# Patient Record
Sex: FEMALE | Race: WHITE | URBAN - METROPOLITAN AREA
[De-identification: names, ages, dates, MRNs, and addresses within clinical notes are randomized per-mention and may not be internally consistent; named-entity substitution may affect disease eponyms.]

---

## 2021-02-03 ENCOUNTER — OFFICE VISIT (OUTPATIENT)
Dept: FAMILY MEDICINE CLINIC | Age: 26
End: 2021-02-03
Payer: COMMERCIAL

## 2021-02-03 ENCOUNTER — TELEPHONE (OUTPATIENT)
Dept: OBGYN CLINIC | Age: 26
End: 2021-02-03

## 2021-02-03 VITALS
TEMPERATURE: 98.2 F | HEART RATE: 98 BPM | SYSTOLIC BLOOD PRESSURE: 120 MMHG | DIASTOLIC BLOOD PRESSURE: 78 MMHG | HEIGHT: 70 IN | OXYGEN SATURATION: 99 % | RESPIRATION RATE: 20 BRPM | WEIGHT: 293 LBS | BODY MASS INDEX: 41.95 KG/M2

## 2021-02-03 DIAGNOSIS — Z23 NEED FOR HPV VACCINE: ICD-10-CM

## 2021-02-03 DIAGNOSIS — Z13.228 SCREENING FOR METABOLIC DISORDER: ICD-10-CM

## 2021-02-03 DIAGNOSIS — Z11.4 ENCOUNTER FOR SCREENING FOR HIV: ICD-10-CM

## 2021-02-03 DIAGNOSIS — Z12.4 CERVICAL CANCER SCREENING: ICD-10-CM

## 2021-02-03 DIAGNOSIS — R51.9 FREQUENT HEADACHES: ICD-10-CM

## 2021-02-03 DIAGNOSIS — Z76.89 ENCOUNTER TO ESTABLISH CARE: Primary | ICD-10-CM

## 2021-02-03 DIAGNOSIS — Z13.220 SCREENING FOR HYPERLIPIDEMIA: ICD-10-CM

## 2021-02-03 DIAGNOSIS — Z11.59 NEED FOR HEPATITIS C SCREENING TEST: ICD-10-CM

## 2021-02-03 DIAGNOSIS — E66.01 CLASS 3 SEVERE OBESITY WITHOUT SERIOUS COMORBIDITY WITH BODY MASS INDEX (BMI) OF 40.0 TO 44.9 IN ADULT, UNSPECIFIED OBESITY TYPE (HCC): ICD-10-CM

## 2021-02-03 PROCEDURE — G8484 FLU IMMUNIZE NO ADMIN: HCPCS | Performed by: NURSE PRACTITIONER

## 2021-02-03 PROCEDURE — 99395 PREV VISIT EST AGE 18-39: CPT | Performed by: NURSE PRACTITIONER

## 2021-02-03 SDOH — ECONOMIC STABILITY: TRANSPORTATION INSECURITY
IN THE PAST 12 MONTHS, HAS LACK OF TRANSPORTATION KEPT YOU FROM MEETINGS, WORK, OR FROM GETTING THINGS NEEDED FOR DAILY LIVING?: NO

## 2021-02-03 SDOH — HEALTH STABILITY: PHYSICAL HEALTH: ON AVERAGE, HOW MANY MINUTES DO YOU ENGAGE IN EXERCISE AT THIS LEVEL?: 30 MIN

## 2021-02-03 SDOH — ECONOMIC STABILITY: FOOD INSECURITY: WITHIN THE PAST 12 MONTHS, YOU WORRIED THAT YOUR FOOD WOULD RUN OUT BEFORE YOU GOT MONEY TO BUY MORE.: NEVER TRUE

## 2021-02-03 SDOH — HEALTH STABILITY: MENTAL HEALTH: HOW MANY STANDARD DRINKS CONTAINING ALCOHOL DO YOU HAVE ON A TYPICAL DAY?: 1 OR 2

## 2021-02-03 SDOH — HEALTH STABILITY: MENTAL HEALTH
STRESS IS WHEN SOMEONE FEELS TENSE, NERVOUS, ANXIOUS, OR CAN'T SLEEP AT NIGHT BECAUSE THEIR MIND IS TROUBLED. HOW STRESSED ARE YOU?: TO SOME EXTENT

## 2021-02-03 SDOH — HEALTH STABILITY: MENTAL HEALTH: HOW OFTEN DO YOU HAVE A DRINK CONTAINING ALCOHOL?: MONTHLY OR LESS

## 2021-02-03 SDOH — HEALTH STABILITY: PHYSICAL HEALTH: ON AVERAGE, HOW MANY DAYS PER WEEK DO YOU ENGAGE IN MODERATE TO STRENUOUS EXERCISE (LIKE A BRISK WALK)?: 5 DAYS

## 2021-02-03 ASSESSMENT — PATIENT HEALTH QUESTIONNAIRE - PHQ9
SUM OF ALL RESPONSES TO PHQ QUESTIONS 1-9: 0
SUM OF ALL RESPONSES TO PHQ QUESTIONS 1-9: 0
1. LITTLE INTEREST OR PLEASURE IN DOING THINGS: 0
2. FEELING DOWN, DEPRESSED OR HOPELESS: 0
SUM OF ALL RESPONSES TO PHQ QUESTIONS 1-9: 0
SUM OF ALL RESPONSES TO PHQ9 QUESTIONS 1 & 2: 0

## 2021-02-03 ASSESSMENT — ENCOUNTER SYMPTOMS
VOMITING: 0
BLURRED VISION: 1
COUGH: 0
DIARRHEA: 0
CONSTIPATION: 0
PHOTOPHOBIA: 1
NAUSEA: 1
SHORTNESS OF BREATH: 0
RESPIRATORY NEGATIVE: 1
ABDOMINAL PAIN: 0

## 2021-02-03 NOTE — PROGRESS NOTES
[facility]  [facility]  [address]  [address] [place] [address]  Dept: [phone]    2/3/2021    CHIEF COMPLAINT    Chief Complaint   Patient presents with    New Patient    Health Maintenance     addressed with the patient       HPI    Brad Munroe is a 23 y.o. female who presents   Chief Complaint   Patient presents with   [address] Patient   [facility] Maintenance     addressed with the patient     Appointment to establish care. Graduated form [employer] for pre-med major. 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 Applying to med school. Apply to [address] [place] [address]. Currently doing [employer] 5 days per week. She is starting weight watchers this past week. Headaches- noting headaches a couple per week and then will not have a HA for a couple weeks. Taking ibuprofen or resting for mild headaches. Taking Excedrin for more severe headaches. Noting increase in frequency roughly 3 years ago. She does not notice an association with stress or menses. Recent eye exam 6/2020. Wearing contacts     Due for pap. She has never had one. She is sexually active. Using condoms. Declines need for TB testing at this time.     Headache This is a recurrent problem. The current episode started more than 1 year ago. The problem occurs intermittently. The problem has been waxing and waning. The pain is located in the retro-orbital, right unilateral and temporal (Noting unilateral headaches 50 % of the time. Noting temporal in relation to tooth clenching) region. The pain does not radiate. The quality of the pain is described as squeezing and throbbing. Associated symptoms include blurred vision (\"sometimes\"), nausea (\"occastionally, but not often\" ) and photophobia. Pertinent negatives include no abdominal pain, coughing, dizziness, fever, loss of balance, phonophobia or vomiting. Exacerbated by: dehydration  She has tried NSAIDs, Excedrin, acetaminophen and darkened room for the symptoms. The treatment provided mild relief. There is no history of migraine headaches, migraines in the family or recent head traumas. Vitals:    02/03/21 1032   BP: 120/78   Site: Left Upper Arm   Position: Sitting   Cuff Size: Large Adult   Pulse: 98   Resp: 20   Temp: 98.2 °F (36.8 °C)   TempSrc: Temporal   SpO2: 99%   Weight: 295 lb 3.2 oz (133.9 kg)   Height: 5' 10.3\" (1.786 m)       Wt Readings from Last 3 Encounters:   02/03/21 295 lb 3.2 oz (133.9 kg)     BP Readings from Last 3 Encounters:   02/03/21 120/78       REVIEW OF SYSTEMS    Review of Systems   Constitutional: Negative. Negative for chills, fatigue and fever. Eyes: Positive for blurred vision (\"sometimes\") and photophobia. Negative for visual disturbance. Respiratory: Negative. Negative for cough and shortness of breath. Cardiovascular: Negative. Negative for chest pain and palpitations. Gastrointestinal: Positive for nausea (\"occastionally, but not often\" ). Negative for abdominal pain, constipation, diarrhea and vomiting. Genitourinary: Negative. Neurological: Positive for headaches. Negative for dizziness and loss of balance. Attends Nondenominational service: None     Active member of club or organization: None     Attends meetings of clubs or organizations: None     Relationship status: None    Intimate partner violence     Fear of current or ex partner: None     Emotionally abused: None     Physically abused: None     Forced sexual activity: None   Other Topics Concern    None   Social History Narrative    None       SURGICAL HISTORY    Past Surgical History:   Procedure Laterality Date    WISDOM TOOTH EXTRACTION         CURRENT MEDICATIONS    No current outpatient medications on file. No current facility-administered medications for this visit. ALLERGIES    No Known Allergies    PHYSICAL EXAM   Physical Exam  Vitals signs and nursing note reviewed. Constitutional:       General: She is not in acute distress. Appearance: She is well-developed. She is obese. She is not diaphoretic. Interventions: Face mask in place. HENT:      Head: Normocephalic. Right Ear: Hearing normal.      Left Ear: Hearing normal.   Eyes:      General:         Right eye: No discharge. Left eye: No discharge. Pupils: Pupils are equal.   Neck:      Musculoskeletal: Normal range of motion. Cardiovascular:      Rate and Rhythm: Normal rate and regular rhythm. Pulses: Normal pulses. Radial pulses are 2+ on the right side and 2+ on the left side. Heart sounds: Normal heart sounds, S1 normal and S2 normal. No murmur. Pulmonary:      Effort: Pulmonary effort is normal. No respiratory distress. Breath sounds: Normal breath sounds. No wheezing. Skin:     General: Skin is warm and dry. Neurological:      Mental Status: She is alert and oriented to person, place, and time. Psychiatric:         Behavior: Behavior normal. Behavior is cooperative. ASSESSMENT/PLAN  1. Encounter to establish care  2.  Frequent headaches  Assessment & Plan: Encouraged patient to stay well-hydrated, attempt to get restorative sleep, check magnesium level and continue over-the-counter pain relievers as needed. Patient to notify office if symptoms worsen or fail to improve  Orders:  -     Comprehensive Metabolic Panel; Future  -     Magnesium; Future  3. Class 3 severe obesity without serious comorbidity with body mass index (BMI) of 40.0 to 44.9 in adult, unspecified obesity type [facility])  Assessment & Plan:  Encouraged to continue following regular exercise regiment and weight watchers. 4. Cervical cancer screening  -     Yassine Damon, [address]N, [place]  5. Screening for hyperlipidemia  -     Lipid Panel; Future  6. Screening for metabolic disorder  -     Comprehensive Metabolic Panel; Future  -     Magnesium; Future  -     TSH with Reflex; Future  7. Encounter for screening for HIV  -     HIV Screen; Future  8. Need for hepatitis C screening test  -     Hepatitis C Antibody; Future  9. Need for HPV vaccine    Educated regarding recommendation for HPV vaccine. Jonna Ruiz received counseling on the following healthy behaviors: nutrition, exercise and medication adherence  Reviewed prior labs and health maintenance  Continue current medications, diet and exercise. Discussed use, benefit, and side effects of prescribed medications. Barriers to medication compliance addressed. Patient given educational materials - see patient instructions  Was a self-tracking handout given in paper form or via [employer]? Yes    Requested Prescriptions      No prescriptions requested or ordered in this encounter       All patient questions answered. Patient voiced understanding. Quality Measures    Body mass index is 42 kg/m². Elevated. Weight control planned discussed Healthy diet and regular exercise. BP: 120/78 Blood pressure is normal. Treatment plan consists of No treatment change needed.

## 2021-02-03 NOTE — PROGRESS NOTES
depression  Chief Complaint   Patient presents with    New Patient   [facility]     addressed with the patient

## 2021-02-24 ENCOUNTER — HOSPITAL ENCOUNTER (OUTPATIENT)
Age: 26
Setting detail: SPECIMEN
Discharge: HOME | End: 2021-02-24
Payer: COMMERCIAL

## 2021-02-24 DIAGNOSIS — Z11.4 ENCOUNTER FOR SCREENING FOR HIV: ICD-10-CM

## 2021-02-24 DIAGNOSIS — Z13.228 SCREENING FOR METABOLIC DISORDER: ICD-10-CM

## 2021-02-24 DIAGNOSIS — Z13.220 SCREENING FOR HYPERLIPIDEMIA: ICD-10-CM

## 2021-02-24 DIAGNOSIS — R51.9 FREQUENT HEADACHES: ICD-10-CM

## 2021-02-24 DIAGNOSIS — Z11.59 NEED FOR HEPATITIS C SCREENING TEST: ICD-10-CM

## 2021-02-24 LAB
ALBUMIN SERPL-MCNC: 4.4 G/DL (ref 3.5–5.2)
ALBUMIN/GLOBULIN RATIO: 1.3 (ref 1–2.5)
ALP BLD-CCNC: 86 U/L (ref 35–104)
ALT SERPL-CCNC: 32 U/L (ref 5–33)
ANION GAP SERPL CALCULATED.3IONS-SCNC: 15 MMOL/L (ref 9–17)
AST SERPL-CCNC: 24 U/L
BILIRUB SERPL-MCNC: 0.29 MG/DL (ref 0.3–1.2)
BUN BLDV-MCNC: 13 MG/DL (ref 6–20)
BUN/CREAT BLD: ABNORMAL (ref 9–20)
CALCIUM SERPL-MCNC: 9.5 MG/DL (ref 8.6–10.4)
CHLORIDE BLD-SCNC: 102 MMOL/L (ref 98–107)
CHOLESTEROL/HDL RATIO: 4
CHOLESTEROL: 198 MG/DL
CO2: 21 MMOL/L (ref 20–31)
CREAT SERPL-MCNC: 0.71 MG/DL (ref 0.5–0.9)
GFR AFRICAN AMERICAN: >60 ML/MIN
GFR NON-AFRICAN AMERICAN: >60 ML/MIN
GFR SERPL CREATININE-BSD FRML MDRD: ABNORMAL ML/MIN/{1.73_M2}
GFR SERPL CREATININE-BSD FRML MDRD: ABNORMAL ML/MIN/{1.73_M2}
GLUCOSE BLD-MCNC: 88 MG/DL (ref 70–99)
HDLC SERPL-MCNC: 50 MG/DL
HEPATITIS C ANTIBODY: NONREACTIVE
HIV AG/AB: NONREACTIVE
LDL CHOLESTEROL: 123 MG/DL (ref 0–130)
MAGNESIUM: 2 MG/DL (ref 1.6–2.6)
POTASSIUM SERPL-SCNC: 4.1 MMOL/L (ref 3.7–5.3)
SODIUM BLD-SCNC: 138 MMOL/L (ref 135–144)
TOTAL PROTEIN: 7.8 G/DL (ref 6.4–8.3)
TRIGL SERPL-MCNC: 125 MG/DL
TSH SERPL DL<=0.05 MIU/L-ACNC: 2.02 MIU/L (ref 0.3–5)
VLDLC SERPL CALC-MCNC: NORMAL MG/DL (ref 1–30)

## 2021-03-05 ENCOUNTER — OFFICE VISIT (OUTPATIENT)
Dept: OBGYN CLINIC | Age: 26
End: 2021-03-05
Payer: COMMERCIAL

## 2021-03-05 ENCOUNTER — HOSPITAL ENCOUNTER (OUTPATIENT)
Age: 26
Setting detail: SPECIMEN
Discharge: HOME | End: 2021-03-05
Payer: COMMERCIAL

## 2021-03-05 VITALS
SYSTOLIC BLOOD PRESSURE: 125 MMHG | HEIGHT: 70 IN | TEMPERATURE: 97.3 F | WEIGHT: 293 LBS | HEART RATE: 106 BPM | BODY MASS INDEX: 41.95 KG/M2 | DIASTOLIC BLOOD PRESSURE: 80 MMHG

## 2021-03-05 DIAGNOSIS — N92.6 IRREGULAR MENSES: ICD-10-CM

## 2021-03-05 DIAGNOSIS — Z01.419 WELL WOMAN EXAM: Primary | ICD-10-CM

## 2021-03-05 DIAGNOSIS — Z23 NEED FOR HPV VACCINATION: ICD-10-CM

## 2021-03-05 DIAGNOSIS — Z01.419 WELL WOMAN EXAM: ICD-10-CM

## 2021-03-05 PROCEDURE — 99385 PREV VISIT NEW AGE 18-39: CPT | Performed by: ADVANCED PRACTICE MIDWIFE

## 2021-03-05 PROCEDURE — G8484 FLU IMMUNIZE NO ADMIN: HCPCS | Performed by: ADVANCED PRACTICE MIDWIFE

## 2021-03-05 RX ORDER — NORETHINDRONE ACETATE AND ETHINYL ESTRADIOL 1MG-20(21)
1 KIT ORAL DAILY
Qty: 1 PACKET | Refills: 12 | Status: SHIPPED | OUTPATIENT
Start: 2021-03-05

## 2021-03-05 SDOH — SOCIAL STABILITY: SOCIAL NETWORK: IN A TYPICAL WEEK, HOW MANY TIMES DO YOU TALK ON THE PHONE WITH FAMILY, FRIENDS, OR NEIGHBORS?: NOT ASKED

## 2021-03-05 SDOH — SOCIAL STABILITY: SOCIAL NETWORK
DO YOU BELONG TO ANY CLUBS OR ORGANIZATIONS SUCH AS CHURCH GROUPS UNIONS, FRATERNAL OR ATHLETIC GROUPS, OR SCHOOL GROUPS?: NOT ASKED

## 2021-03-05 SDOH — SOCIAL STABILITY: SOCIAL INSECURITY: WITHIN THE LAST YEAR, HAVE YOU BEEN HUMILIATED OR EMOTIONALLY ABUSED IN OTHER WAYS BY YOUR PARTNER OR EX-PARTNER?: NO

## 2021-03-05 SDOH — SOCIAL STABILITY: SOCIAL NETWORK: ARE YOU MARRIED, WIDOWED, DIVORCED, SEPARATED, NEVER MARRIED, OR LIVING WITH A PARTNER?: NOT ASKED

## 2021-03-05 SDOH — SOCIAL STABILITY: SOCIAL INSECURITY: WITHIN THE LAST YEAR, HAVE YOU BEEN AFRAID OF YOUR PARTNER OR EX-PARTNER?: NO

## 2021-03-05 SDOH — SOCIAL STABILITY: SOCIAL INSECURITY
WITHIN THE LAST YEAR, HAVE YOU BEEN KICKED, HIT, SLAPPED, OR OTHERWISE PHYSICALLY HURT BY YOUR PARTNER OR EX-PARTNER?: NO

## 2021-03-05 SDOH — SOCIAL STABILITY: SOCIAL NETWORK: HOW OFTEN DO YOU ATTENT MEETINGS OF THE CLUB OR ORGANIZATION YOU BELONG TO?: NOT ASKED

## 2021-03-05 SDOH — SOCIAL STABILITY: SOCIAL NETWORK: HOW OFTEN DO YOU ATTEND CHURCH OR RELIGIOUS SERVICES?: NOT ASKED

## 2021-03-05 SDOH — SOCIAL STABILITY: SOCIAL NETWORK: HOW OFTEN DO YOU GET TOGETHER WITH FRIENDS OR RELATIVES?: NOT ASKED

## 2021-03-05 ASSESSMENT — ANXIETY QUESTIONNAIRES
1. FEELING NERVOUS, ANXIOUS, OR ON EDGE: 0-NOT AT ALL
2. NOT BEING ABLE TO STOP OR CONTROL WORRYING: 0-NOT AT ALL
5. BEING SO RESTLESS THAT IT IS HARD TO SIT STILL: 0-NOT AT ALL
GAD7 TOTAL SCORE: 0
3. WORRYING TOO MUCH ABOUT DIFFERENT THINGS: 0-NOT AT ALL
7. FEELING AFRAID AS IF SOMETHING AWFUL MIGHT HAPPEN: 0-NOT AT ALL

## 2021-03-05 ASSESSMENT — PATIENT HEALTH QUESTIONNAIRE - PHQ9
SUM OF ALL RESPONSES TO PHQ QUESTIONS 1-9: 0
2. FEELING DOWN, DEPRESSED OR HOPELESS: 0
SUM OF ALL RESPONSES TO PHQ QUESTIONS 1-9: 0

## 2021-03-05 ASSESSMENT — ENCOUNTER SYMPTOMS
DIARRHEA: 0
SHORTNESS OF BREATH: 0
NAUSEA: 0
ABDOMINAL PAIN: 0
VOMITING: 0

## 2021-03-05 NOTE — PROGRESS NOTES
Patient would like to discuss contraception currently not on anything. After obtaining consent, and per orders of [facility] CNM, injection of Gardasil given in Right deltoid by Leonela Wilhelm. Patient instructed to remain in clinic for 20 minutes afterwards, and to report any adverse reaction to me immediately.     [id number]  QLU:[id number]  EXP:9/6/22    Vaccine information statement HPV (Human Papilloma Virus) edition 10/30/2019 given to patient on 3/5/2021

## 2021-03-05 NOTE — PROGRESS NOTES
[place] [address] [facility] [address]  Dept: [phone]    Patient Name: Majo Van  Patient Age: 23 y.o. Date of Visit: 3/5/2021    Subjective  Chief Complaint   Patient presents with    Gynecologic Exam     NP to establish, Never had pap / Nicolas Pleva denied     Patient's last menstrual period was 02/20/2021 (exact date). Chaperone for Intimate Exam   Chaperone was offered as part of the rooming process. Patient declined and agrees to continue with exam without a chaperone.  Chaperone: n/a    HPI  Patient arrives for annual exam.  Patient admits to concerns today. Concerns include irregular menses. Reports she has always had irregular menses since starting her period was she was [age]. Reports sometimes they are 30 day cycles, sometimes in the 40's or she can skip a month at a time. Patient reports is  sexually active with 1 male partner(s). Patient denies  pain with sex. Patient denies a history of sexually transmitted infection(s). Patient does want screening for sexually transmitted infection(s). Reports is not on contraception. 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    She reports there is a personal history of:    Smoking (> 15 cigs/day): No    Migraine with Aura:  No    HTN (> 160/100): No    DVT:  No    Thrombophilias:  No    Stroke (CVA): No     Ischemic heart disease:  No    Valvular heart disease (A Fib, Pul HTN, etc):  No    Positive Antiphospholipid Abs:  No    Liver Disease:  No      PHQ Scores 3/5/2021 2/3/2021   PHQ2 Score 0 0   PHQ9 Score 0 0     Interpretation of Total Score Depression Severity: 1-4 = Minimal depression, 5-9 = Mild depression, 10-14 = Moderate depression, 15-19 = Moderately severe depression, 20-27 = Severe depression  CHARLES 7 SCORE 3/5/2021   CHARLES-7 Total Score 0     Interpretation of CHARLES-7 score: 5-9 = mild anxiety, 10-14 = moderate anxiety, 15+ = severe anxiety. Recommend referral to behavioral health for scores 10 or greater. Social Determinants of Health:      Social Needs    Financial resource strain: Not hard at all      Social Needs   Food insecurity    Worry: Never true    Inability: Never true      Social Needs   Transportation needs    Medical: No    Non-medical: No        Lifestyle    Stress: To some extent      Relationships   Intimate partner violence    Fear of current or ex partner: No    Emotionally abused: No    Physically abused: No    Forced sexual activity: No       Review of Systems   Constitutional: Negative for unexpected weight change. Respiratory: Negative for shortness of breath. Cardiovascular: Negative for chest pain, palpitations and leg swelling. Gastrointestinal: Negative for abdominal pain, diarrhea, nausea and vomiting. Genitourinary: Positive for menstrual problem. Negative for difficulty urinating, dyspareunia, vaginal discharge and vaginal pain. Neurological: Negative for dizziness, light-headedness and headaches. Preventive Health Screening:   Date of last pap:n/a    Preventive screening: Yes    Family history of Breast, Ovarian, Colon or Uterine Cancer:  no     If Yes see scanned worksheet    Objective  /80   Pulse 106   Temp 97.3 °F (36.3 °C)   Ht 5' 10\" (1.778 m)   Wt (!) 303 lb 9.6 oz (137.7 kg)   LMP 02/20/2021 (Exact Date)   BMI 43.56 kg/m²       Gynecologic History  Menarche: [age]  Monthly menses (days): irregular   Length: 5  Flow: moderate    Gardasil Series: Yes    Sexually active: Yes  New Sex Partner within 3 months: No  Domestic Violence Screening: negative    STD history: No     Birth control method :No       Physical Exam  Constitutional:       Appearance: Normal appearance. Genitourinary:      No vulval tenderness, ulcerations or Bartholin's cyst noted. No signs of labial injury.       No signs of injury in the vagina. No vaginal tenderness or ulceration. No cervical motion tenderness or friability. Genitourinary Comments: Pap obtained without difficulty    Neck:      Musculoskeletal: Normal range of motion. Cardiovascular:      Rate and Rhythm: Normal rate and regular rhythm. Pulmonary:      Effort: Pulmonary effort is normal.      Breath sounds: Normal breath sounds. Neurological:      Mental Status: She is alert and oriented to person, place, and time. Psychiatric:         Mood and Affect: Mood normal.         Behavior: Behavior normal.         Thought Content: Thought content normal.         Judgment: Judgment normal.   Vitals signs reviewed. Assessment & Plan  1. Well woman exam  - PAP SMEAR; Future  - C.trachomatis N.gonorrhoeae DNA, Thin Prep; Future    Age 25 and > Well Woman Care  General Health:  [x] Alcohol screening & counseling  [x] Blood pressure screening: normal  [x] Contraceptive counseling & methods: starting OCP  [x] Depression Screening: Negative  [] Diabetes Screening: did not discuss at visit     [] Folic acid supplementation  [x] Healthful diet & activity counseling:  discussed  [x] Interpersonal violence screening: Negative  [x] Lipid screening: done on 02/2021  [x] Obesity Screening: Body mass index is 43.56 kg/m². obese  [] Osteoporosis screening  [x] Substance use screening & counseling  [x] Tobacco screening & counseling  [x] Urinary incontinence screening    Infectious Diseases:  [x] Gonorrhea & chlamydia screening: discussed  [x] Hepatitis C Screening results reviewed  [x] HIV risk assessment/ testing (at least once in lifetime): results reviewed   [x] Immunizations:   [x] STI prevention counseling    Cancer:  [x] Cervical cancer screening: discussed. Pap per ASCCP guidelines   [x] Mammograms (started at 39yrs old): discussed  [x] BRCA testing risk assessment: discussed      2.  Need for HPV vaccination  - only received 2/3 series, last injection given at visit today.   - Vaccine information statement HPV (Human Papilloma Virus) edition 10/30/2019 given to patient on 3/5/2021  - hpv vaccine (GARDASIL) injection 0.5 mL    3. Irregular menses  - Discussed options and patient desires to trial OCP. Reports she has not had sex since her last menstrual period. Reviewed she can start the pill now or wait until the first day of her next menses. Recommended using condoms with every sexual encounter. Reviewed if she starts pills now will need to use a back up method for 7 days to be protected against a pregnancy  - Patient was educated to notify office and seek medical care if abdominal pain, chest pain, severe headaches, eye problems/loss of vision, or severe leg pain or swelling in the calf occurs (ACHES). - norethindrone-ethinyl estradiol (LOESTRIN FE 1/20) 1-20 MG-MCG per tablet; Take 1 tablet by mouth daily  Dispense: 1 packet; Refill: 12      Return in about 1 year (around 3/5/2022) for Annual.     The patient, Shellie Banks , was seen with a total time spent of 25 minutes for the visit on this date of service by the [facility]  The time component, involved both face-to-face (counseling and education)  and non face-to-face time (care coordination), spent in determining the total time component. She was also counseled on her preventative health maintenance recommendations and follow-up.     Electronically Signed by Dania Rojas

## 2021-03-09 LAB
CHLAMYDIA BY THIN PREP: NEGATIVE
N. GONORRHOEAE DNA, THIN PREP: NEGATIVE
SPECIMEN DESCRIPTION: NORMAL

## 2021-03-13 LAB — CYTOLOGY REPORT: NORMAL

## 2021-07-22 ENCOUNTER — TELEPHONE (OUTPATIENT)
Dept: FAMILY MEDICINE CLINIC | Age: 26
End: 2021-07-22

## 2021-07-22 DIAGNOSIS — Z01.84 IMMUNITY STATUS TESTING: Primary | ICD-10-CM

## 2021-07-22 NOTE — TELEPHONE ENCOUNTER
Per asking to order the following for school:  Hepatitis B surface antibody quantitation test, will use [employer]. Please order.

## 2021-07-23 ENCOUNTER — HOSPITAL ENCOUNTER (OUTPATIENT)
Age: 26
Setting detail: SPECIMEN
Discharge: HOME | End: 2021-07-23
Payer: COMMERCIAL

## 2021-07-23 DIAGNOSIS — Z01.84 IMMUNITY STATUS TESTING: ICD-10-CM

## 2021-07-23 LAB — HBV SURFACE AB TITR SER: <3.5 MIU/ML

## 2021-07-23 NOTE — TELEPHONE ENCOUNTER
Sammiem that lab order is at a  [address] [place] [address], call if any questions or other testing needs done

## 2021-07-30 ENCOUNTER — NURSE ONLY (OUTPATIENT)
Dept: FAMILY MEDICINE CLINIC | Age: 26
End: 2021-07-30
Payer: COMMERCIAL

## 2021-07-30 ENCOUNTER — TELEPHONE (OUTPATIENT)
Dept: FAMILY MEDICINE CLINIC | Age: 26
End: 2021-07-30

## 2021-07-30 DIAGNOSIS — Z23 NEED FOR HEPATITIS B VACCINATION: Primary | ICD-10-CM

## 2021-07-30 PROCEDURE — 90471 IMMUNIZATION ADMIN: CPT | Performed by: NURSE PRACTITIONER

## 2021-07-30 PROCEDURE — 90746 HEPB VACCINE 3 DOSE ADULT IM: CPT | Performed by: NURSE PRACTITIONER

## 2021-07-30 NOTE — TELEPHONE ENCOUNTER
----- Message from Annie Dickson sent at 7/29/2021  1:37 PM EDT -----  Subject: Message to Provider    QUESTIONS  Information for Provider? Pt dropped off some school paperwork for college   immunization records and what not. Pt would like them ready tomorrow, at   the desk. She dropped them off last monday.   ---------------------------------------------------------------------------  --------------  CALL BACK INFO  What is the best way for the office to contact you? OK to leave message on   voicemail, OK to respond with electronic message via [employer] portal (only   for patients who have registered [employer] account)  Preferred Call Back Phone Number? [phone]  ---------------------------------------------------------------------------  --------------  SCRIPT ANSWERS  Relationship to Patient?  Self

## 2022-03-18 ENCOUNTER — OFFICE VISIT (OUTPATIENT)
Dept: FAMILY MEDICINE CLINIC | Age: 27
End: 2022-03-18
Payer: COMMERCIAL

## 2022-03-18 VITALS
DIASTOLIC BLOOD PRESSURE: 78 MMHG | TEMPERATURE: 97.9 F | HEART RATE: 88 BPM | HEIGHT: 70 IN | SYSTOLIC BLOOD PRESSURE: 124 MMHG | BODY MASS INDEX: 41.23 KG/M2 | OXYGEN SATURATION: 97 % | WEIGHT: 288 LBS | RESPIRATION RATE: 16 BRPM

## 2022-03-18 DIAGNOSIS — Z00.00 ENCOUNTER FOR WELL ADULT EXAM WITHOUT ABNORMAL FINDINGS: Primary | ICD-10-CM

## 2022-03-18 DIAGNOSIS — E66.01 CLASS 3 SEVERE OBESITY WITHOUT SERIOUS COMORBIDITY WITH BODY MASS INDEX (BMI) OF 40.0 TO 44.9 IN ADULT, UNSPECIFIED OBESITY TYPE (HCC): ICD-10-CM

## 2022-03-18 PROCEDURE — 99395 PREV VISIT EST AGE 18-39: CPT | Performed by: NURSE PRACTITIONER

## 2022-03-18 PROCEDURE — G8482 FLU IMMUNIZE ORDER/ADMIN: HCPCS | Performed by: NURSE PRACTITIONER

## 2022-03-18 SDOH — ECONOMIC STABILITY: FOOD INSECURITY: WITHIN THE PAST 12 MONTHS, THE FOOD YOU BOUGHT JUST DIDN'T LAST AND YOU DIDN'T HAVE MONEY TO GET MORE.: NEVER TRUE

## 2022-03-18 SDOH — ECONOMIC STABILITY: TRANSPORTATION INSECURITY
IN THE PAST 12 MONTHS, HAS THE LACK OF TRANSPORTATION KEPT YOU FROM MEDICAL APPOINTMENTS OR FROM GETTING MEDICATIONS?: NO

## 2022-03-18 SDOH — ECONOMIC STABILITY: FOOD INSECURITY: WITHIN THE PAST 12 MONTHS, YOU WORRIED THAT YOUR FOOD WOULD RUN OUT BEFORE YOU GOT MONEY TO BUY MORE.: NEVER TRUE

## 2022-03-18 ASSESSMENT — SOCIAL DETERMINANTS OF HEALTH (SDOH): HOW HARD IS IT FOR YOU TO PAY FOR THE VERY BASICS LIKE FOOD, HOUSING, MEDICAL CARE, AND HEATING?: NOT HARD AT ALL

## 2022-03-18 ASSESSMENT — ENCOUNTER SYMPTOMS
COUGH: 0
EYES NEGATIVE: 1
DIARRHEA: 0
GASTROINTESTINAL NEGATIVE: 1
CONSTIPATION: 0
SHORTNESS OF BREATH: 0
VOMITING: 0
BACK PAIN: 0
ABDOMINAL PAIN: 0
RESPIRATORY NEGATIVE: 1
NAUSEA: 0

## 2022-03-18 ASSESSMENT — PATIENT HEALTH QUESTIONNAIRE - PHQ9
SUM OF ALL RESPONSES TO PHQ QUESTIONS 1-9: 0
1. LITTLE INTEREST OR PLEASURE IN DOING THINGS: 0
SUM OF ALL RESPONSES TO PHQ9 QUESTIONS 1 & 2: 0
SUM OF ALL RESPONSES TO PHQ QUESTIONS 1-9: 0
2. FEELING DOWN, DEPRESSED OR HOPELESS: 0

## 2022-03-18 NOTE — PROGRESS NOTES
Well Adult Note  Name: Jovani Dias Date: 3/18/2022   MRN: [id number] Sex: Female   Age: 27 y.o. Ethnicity: Non- / Non    : 1995 Race: White (non-)      Alivia Austin is here for well adult exam.  Last Pap 3/5/2021 with [facility] was within normal limits. Continues going to [employer] at [facility] which keeps her very busy. Doing well with nutrition by eating at home and is participating in [employer] at [facility]. Playing [employer] and enjoying that. She participates in game nights at school. History:    Review of Systems   Constitutional: Negative. Negative for chills, fatigue and fever. Eyes: Negative. Negative for visual disturbance (blurred vision still occuring at times. Not long lasting, no eye pain or discharge). Respiratory: Negative. Negative for cough and shortness of breath. Cardiovascular: Negative. Negative for chest pain and palpitations. Gastrointestinal: Negative. Negative for abdominal pain, constipation, diarrhea, nausea and vomiting. Genitourinary: Negative. Continues taking BC with good control of periods symptoms. Musculoskeletal: Negative. Negative for back pain. Neurological: Negative. Negative for dizziness and headaches (Intermittent with stress). Psychiatric/Behavioral: Negative. Negative for dysphoric mood. The patient is not nervous/anxious. No Known Allergies      Prior to Visit Medications    Medication Sig Taking?  Authorizing Provider   norethindrone-ethinyl estradiol (LOESTRIN FE ) 1-20 MG-MCG per tablet Take 1 tablet by mouth daily Yes Edelmira Do, APRN - CNM         Past Medical History:   Diagnosis Date    Headache 2012    Obesity        Past Surgical History:   Procedure Laterality Date    WISDOM TOOTH EXTRACTION           Family History   Problem Relation Age of Onset    Other Mother         endometriosis with h/o GERRI    No Known Problems Father     No Known Problems Brother     Breast Cancer Maternal Grandmother         dx in 76s     Kidney Cancer Maternal Grandmother         HD     Heart Surgery Maternal Grandmother         PPM     Alzheimer's Disease Maternal Grandfather     Heart Surgery Paternal Grandmother         PPM     Heart Disease Paternal Grandmother     Heart Attack Paternal Grandmother         ? ?     Lung Cancer Paternal Grandfather         COD; non-smoker    Esophageal Cancer Maternal Uncle         COD    Stroke Paternal Uncle        Social History     Tobacco Use    Smoking status: Never Smoker    Smokeless tobacco: Never Used   Vaping Use    Vaping Use: Never used   Substance Use Topics    Alcohol use: Yes     Alcohol/week: 1.0 standard drink     Types: 1 Glasses of wine per week    Drug use: Never       Objective   /78 (Site: Left Upper Arm, Position: Sitting, Cuff Size: Large Adult)   Pulse 88   Temp 97.9 °F (36.6 °C) (Temporal)   Resp 16   Ht 5' 10\" (1.778 m)   Wt 288 lb (130.6 kg)   LMP 02/22/2022   SpO2 97%   BMI 41.32 kg/m²   Wt Readings from Last 3 Encounters:   03/18/22 288 lb (130.6 kg)   03/05/21 (!) 303 lb 9.6 oz (137.7 kg)   02/03/21 295 lb 3.2 oz (133.9 kg)     There were no vitals filed for this visit. Physical Exam  Vitals and nursing note reviewed. Constitutional:       General: She is not in acute distress. Appearance: She is well-developed. She is obese. She is not diaphoretic. Interventions: Face mask in place. HENT:      Head: Normocephalic. Right Ear: Hearing, tympanic membrane, ear canal and external ear normal.      Left Ear: Hearing, tympanic membrane, ear canal and external ear normal.      Nose: Nose normal. No mucosal edema. Mouth/Throat: Tonsils: No tonsillar exudate or tonsillar abscesses. 3+ on the right. 3+ on the left. Eyes:      General:         Right eye: No discharge. Left eye: No discharge.       Conjunctiva/sclera: Conjunctivae normal.      Pupils: Pupils are equal, round, and reactive to light. Neck:      Thyroid: No thyromegaly. Cardiovascular:      Rate and Rhythm: Normal rate and regular rhythm. Heart sounds: Normal heart sounds, S1 normal and S2 normal. No murmur heard. Pulmonary:      Effort: Pulmonary effort is normal. No respiratory distress. Breath sounds: Normal breath sounds. No wheezing. Abdominal:      General: There is no distension. Palpations: Abdomen is soft. Tenderness: There is no abdominal tenderness. Musculoskeletal:      Cervical back: Neck supple. Lymphadenopathy:      Cervical: No cervical adenopathy. Skin:     General: Skin is warm and dry. Findings: No erythema or rash. Neurological:      Mental Status: She is alert and oriented to person, place, and time. Psychiatric:         Behavior: Behavior normal. Behavior is cooperative. Assessment   Plan   1. Encounter for well adult exam without abnormal findings  2. Class 3 severe obesity without serious comorbidity with body mass index (BMI) of 40.0 to 44.9 in adult, unspecified obesity type [facility])  Assessment & Plan:  Encouraged to continue following regular exercise regiment and weight watchers.          Personalized Preventive Plan   Current Health Maintenance Status  Immunization History   Administered Date(s) Administered    COVID-19, Pfizer Purple top, DILUTE for use, 12+ yrs, 30mcg/0.3mL dose 01/12/2021, 02/02/2021, 10/25/2021    DTaP 1995, 1995, 01/14/1998, 12/02/1998, 05/08/2002    DTaP (Infanrix) 1995, 1995, 01/14/1998, 12/02/1998, 05/08/2002    HIB PRP-T (ActHIB, Hiberix) 1997, 1997, 01/24/1998, 12/02/1998    HPV 9-valent Mennie Chandra) 07/23/2013, 07/24/2014    HPV Quadrivalent (Gardasil) 07/23/2013, 07/24/2014, 03/05/2021    Hepatitis A Ped/Adol (Havrix, Vaqta) 10/20/2011, 07/23/2013    Hepatitis B 08/30/2021, 12/02/2021    Hepatitis B Adult (Engerix-B) 07/30/2021    Hepatitis B Ped/Adol (Engerix-B, Recombivax HB) 1995, 1997, 04/01/1998    Influenza A (Q0E9-07) Vaccine PF IM 11/18/2009    Influenza Virus Vaccine 10/20/2011, 10/29/2019    Influenza, Loletta Flatten, IM, PF (6 mo and older Fluzone, Flulaval, Fluarix, and 3 yrs and older Afluria) 10/25/2021    MMR 12/02/1998, 05/08/2002    Meningococcal MCV4O (Menveo) 10/20/2011, 07/24/2014    Meningococcal MCV4P (Menactra) 10/20/2011, 07/24/2014    Polio IPV (IPOL) 1997, 1997, 07/06/1998, 05/08/2002    Tdap (Boostrix, Adacel) 10/20/2011, 08/30/2021    Varicella (Varivax) 07/06/1998, 10/20/2011        Health Maintenance   Topic Date Due    Depression Screen  Never done    Chlamydia screen  03/18/2023 (Originally 3/5/2022)    Pap smear  03/05/2024    DTaP/Tdap/Td vaccine (8 - Td or Tdap) 08/30/2031    Hepatitis A vaccine  Completed    Hepatitis B vaccine  Completed    Hib vaccine  Completed    HPV vaccine  Completed    Varicella vaccine  Completed    Meningococcal (ACWY) vaccine  Completed    Flu vaccine  Completed    COVID-19 Vaccine  Completed    Hepatitis C screen  Completed    HIV screen  Completed    Pneumococcal 0-64 years Vaccine  Aged Out     Recommendations for Preventive Services Due: see orders and patient instructions/AVS.    Return in about 1 year (around 3/18/2023) for Physical.

## 2022-03-18 NOTE — PROGRESS NOTES
Depression screening done  Financial resource strain done  Chief Complaint   Patient presents with   [employer] Exam

## 2022-10-18 NOTE — ASSESSMENT & PLAN NOTE
Encouraged patient to stay well-hydrated, attempt to get restorative sleep, check magnesium level and continue over-the-counter pain relievers as needed.   Patient to notify office if symptoms worsen or fail to improve Detail Level: Detailed Depth Of Biopsy: dermis Was A Bandage Applied: Yes Size Of Lesion In Cm: 0 Biopsy Type: H and E Biopsy Method: scissors Anesthesia Type: 1% lidocaine with 1:100,000 epinephrine Anesthesia Volume In Cc (Will Not Render If 0): 1 Hemostasis: Aluminum Chloride Wound Care: Vaseline Dressing: Band-Aid Destruction After The Procedure: No Type Of Destruction Used: Electrodesiccation and Curettage Cryotherapy Text: The wound bed was treated with cryotherapy after the biopsy was performed. Electrodesiccation Text: The wound bed was treated with electrodesiccation after the biopsy was performed. Electrodesiccation And Curettage Text: The wound bed was treated with electrodesiccation and curettage after the biopsy was performed. Silver Nitrate Text: The wound bed was treated with silver nitrate after the biopsy was performed. Lab: [facility] Lab Facility: [address] Consent: The provider's intent is to obtain a tissue sample solely for diagnostic purposes. Written consent was obtained and risks were reviewed including but not limited to scarring, infection, bleeding, scabbing, incomplete removal, nerve damage and allergy to anesthesia. Post-Care Instructions: I reviewed with the patient in detail post-care instructions. Patient is to keep the biopsy site dry overnight, and then apply bacitracin twice daily until healed. Patient may apply hydrogen peroxide soaks to remove any crusting. Notification Instructions: Patient will be notified of biopsy results. However, patient instructed to call the office if not contacted within 2 weeks. Billing Type: [employer] Information: Selecting Yes will display possible errors in your note based on the variables you have selected. This validation is only offered as a suggestion for you. PLEASE NOTE THAT THE VALIDATION TEXT WILL BE REMOVED WHEN YOU FINALIZE YOUR NOTE. IF YOU WANT TO FAX A PRELIMINARY NOTE YOU WILL NEED TO TOGGLE THIS TO 'NO' IF YOU DO NOT WANT IT IN YOUR FAXED NOTE.

## 2023-05-01 ENCOUNTER — TELEPHONE (OUTPATIENT)
Dept: FAMILY MEDICINE CLINIC | Age: 28
End: 2023-05-01

## 2023-05-01 NOTE — TELEPHONE ENCOUNTER
Lmovm forMorgan to call the office. 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  The office rescheduled her for 05/22/23 @ 3:20 pm

## 2023-05-18 RX ORDER — SPIRONOLACTONE 100 MG/1
100 TABLET, FILM COATED ORAL EVERY MORNING
COMMUNITY
Start: 2023-03-07